# Patient Record
Sex: MALE | Race: WHITE | NOT HISPANIC OR LATINO | Employment: UNEMPLOYED
[De-identification: names, ages, dates, MRNs, and addresses within clinical notes are randomized per-mention and may not be internally consistent; named-entity substitution may affect disease eponyms.]

---

## 2018-12-12 ENCOUNTER — RECORDS - HEALTHEAST (OUTPATIENT)
Dept: ADMINISTRATIVE | Facility: OTHER | Age: 58
End: 2018-12-12

## 2018-12-12 LAB
LAB AP CHARGES (HE HISTORICAL CONVERSION): NORMAL
PATH REPORT.COMMENTS IMP SPEC: NORMAL
PATH REPORT.COMMENTS IMP SPEC: NORMAL
PATH REPORT.FINAL DX SPEC: NORMAL
PATH REPORT.GROSS SPEC: NORMAL
PATH REPORT.MICROSCOPIC SPEC OTHER STN: NORMAL
PATH REPORT.RELEVANT HX SPEC: NORMAL
RESULT FLAG (HE HISTORICAL CONVERSION): NORMAL

## 2021-08-17 ENCOUNTER — HOSPITAL ENCOUNTER (EMERGENCY)
Facility: HOSPITAL | Age: 61
Discharge: HOME | End: 2021-08-17
Admitting: PHYSICIAN ASSISTANT
Payer: COMMERCIAL

## 2021-08-17 ENCOUNTER — APPOINTMENT (OUTPATIENT)
Dept: CT IMAGING | Facility: HOSPITAL | Age: 61
End: 2021-08-17
Payer: COMMERCIAL

## 2021-08-17 VITALS
RESPIRATION RATE: 16 BRPM | DIASTOLIC BLOOD PRESSURE: 71 MMHG | HEIGHT: 66 IN | OXYGEN SATURATION: 91 % | SYSTOLIC BLOOD PRESSURE: 127 MMHG | HEART RATE: 77 BPM | TEMPERATURE: 97 F | BODY MASS INDEX: 32.95 KG/M2 | WEIGHT: 205 LBS

## 2021-08-17 DIAGNOSIS — N20.0 NEPHROLITHIASIS: ICD-10-CM

## 2021-08-17 DIAGNOSIS — R10.9 ACUTE RIGHT FLANK PAIN: ICD-10-CM

## 2021-08-17 LAB
ALBUMIN UR-MCNC: 20 MG/DL
ANION GAP SERPL CALCULATED.3IONS-SCNC: 11 MMOL/L (ref 5–18)
APPEARANCE UR: CLEAR
BACTERIA #/AREA URNS HPF: ABNORMAL /HPF
BILIRUB UR QL STRIP: NEGATIVE
BUN SERPL-MCNC: 14 MG/DL (ref 8–22)
C REACTIVE PROTEIN LHE: 0.3 MG/DL (ref 0–0.8)
CALCIUM SERPL-MCNC: 9.1 MG/DL (ref 8.5–10.5)
CHLORIDE BLD-SCNC: 104 MMOL/L (ref 98–107)
CO2 SERPL-SCNC: 26 MMOL/L (ref 22–31)
COLOR UR AUTO: YELLOW
CREAT SERPL-MCNC: 1.1 MG/DL (ref 0.7–1.3)
ERYTHROCYTE [DISTWIDTH] IN BLOOD BY AUTOMATED COUNT: 13.3 % (ref 10–15)
GFR SERPL CREATININE-BSD FRML MDRD: 73 ML/MIN/1.73M2
GLUCOSE BLD-MCNC: 157 MG/DL (ref 70–125)
GLUCOSE UR STRIP-MCNC: NEGATIVE MG/DL
HCT VFR BLD AUTO: 44.6 % (ref 40–53)
HGB BLD-MCNC: 15.1 G/DL (ref 13.3–17.7)
HGB UR QL STRIP: NEGATIVE
KETONES UR STRIP-MCNC: NEGATIVE MG/DL
LEUKOCYTE ESTERASE UR QL STRIP: ABNORMAL
MCH RBC QN AUTO: 31.3 PG (ref 26.5–33)
MCHC RBC AUTO-ENTMCNC: 33.9 G/DL (ref 31.5–36.5)
MCV RBC AUTO: 93 FL (ref 78–100)
MUCOUS THREADS #/AREA URNS LPF: PRESENT /LPF
NITRATE UR QL: NEGATIVE
PH UR STRIP: 6.5 [PH] (ref 5–7)
PLATELET # BLD AUTO: 194 10E3/UL (ref 150–450)
POTASSIUM BLD-SCNC: 3.6 MMOL/L (ref 3.5–5)
RBC # BLD AUTO: 4.82 10E6/UL (ref 4.4–5.9)
RBC URINE: 8 /HPF
SODIUM SERPL-SCNC: 141 MMOL/L (ref 136–145)
SP GR UR STRIP: 1.03 (ref 1–1.03)
SQUAMOUS EPITHELIAL: <1 /HPF
UROBILINOGEN UR STRIP-MCNC: <2 MG/DL
WBC # BLD AUTO: 9.8 10E3/UL (ref 4–11)
WBC URINE: 14 /HPF

## 2021-08-17 PROCEDURE — 99285 EMERGENCY DEPT VISIT HI MDM: CPT | Mod: 25

## 2021-08-17 PROCEDURE — 74176 CT ABD & PELVIS W/O CONTRAST: CPT

## 2021-08-17 PROCEDURE — 250N000013 HC RX MED GY IP 250 OP 250 PS 637: Performed by: EMERGENCY MEDICINE

## 2021-08-17 PROCEDURE — 96375 TX/PRO/DX INJ NEW DRUG ADDON: CPT

## 2021-08-17 PROCEDURE — 80048 BASIC METABOLIC PNL TOTAL CA: CPT | Performed by: PHYSICIAN ASSISTANT

## 2021-08-17 PROCEDURE — 87086 URINE CULTURE/COLONY COUNT: CPT | Performed by: EMERGENCY MEDICINE

## 2021-08-17 PROCEDURE — 81001 URINALYSIS AUTO W/SCOPE: CPT | Performed by: EMERGENCY MEDICINE

## 2021-08-17 PROCEDURE — 86141 C-REACTIVE PROTEIN HS: CPT | Performed by: PHYSICIAN ASSISTANT

## 2021-08-17 PROCEDURE — 96372 THER/PROPH/DIAG INJ SC/IM: CPT | Performed by: PHYSICIAN ASSISTANT

## 2021-08-17 PROCEDURE — 250N000011 HC RX IP 250 OP 636: Performed by: PHYSICIAN ASSISTANT

## 2021-08-17 PROCEDURE — 96361 HYDRATE IV INFUSION ADD-ON: CPT

## 2021-08-17 PROCEDURE — 36415 COLL VENOUS BLD VENIPUNCTURE: CPT | Performed by: PHYSICIAN ASSISTANT

## 2021-08-17 PROCEDURE — 258N000003 HC RX IP 258 OP 636: Performed by: PHYSICIAN ASSISTANT

## 2021-08-17 PROCEDURE — 85027 COMPLETE CBC AUTOMATED: CPT | Performed by: PHYSICIAN ASSISTANT

## 2021-08-17 PROCEDURE — 250N000011 HC RX IP 250 OP 636: Performed by: EMERGENCY MEDICINE

## 2021-08-17 PROCEDURE — 96374 THER/PROPH/DIAG INJ IV PUSH: CPT

## 2021-08-17 RX ORDER — ONDANSETRON 4 MG/1
4 TABLET, ORALLY DISINTEGRATING ORAL ONCE
Status: COMPLETED | OUTPATIENT
Start: 2021-08-17 | End: 2021-08-17

## 2021-08-17 RX ORDER — DIMENHYDRINATE 50 MG
50 TABLET ORAL AT BEDTIME
Qty: 7 TABLET | Refills: 0 | Status: SHIPPED | OUTPATIENT
Start: 2021-08-17 | End: 2021-08-24

## 2021-08-17 RX ORDER — OXYCODONE HYDROCHLORIDE 5 MG/1
5 TABLET ORAL EVERY 4 HOURS PRN
Qty: 12 TABLET | Refills: 0 | Status: SHIPPED | OUTPATIENT
Start: 2021-08-17 | End: 2021-08-21

## 2021-08-17 RX ORDER — ACETAMINOPHEN 325 MG/1
975 TABLET ORAL ONCE
Status: COMPLETED | OUTPATIENT
Start: 2021-08-17 | End: 2021-08-17

## 2021-08-17 RX ORDER — MORPHINE SULFATE 4 MG/ML
4 INJECTION, SOLUTION INTRAMUSCULAR; INTRAVENOUS ONCE
Status: COMPLETED | OUTPATIENT
Start: 2021-08-17 | End: 2021-08-17

## 2021-08-17 RX ORDER — ONDANSETRON 2 MG/ML
4 INJECTION INTRAMUSCULAR; INTRAVENOUS ONCE
Status: COMPLETED | OUTPATIENT
Start: 2021-08-17 | End: 2021-08-17

## 2021-08-17 RX ORDER — CEPHALEXIN 500 MG/1
500 CAPSULE ORAL 4 TIMES DAILY
Qty: 28 CAPSULE | Refills: 0 | Status: SHIPPED | OUTPATIENT
Start: 2021-08-17 | End: 2021-08-24

## 2021-08-17 RX ORDER — ONDANSETRON 4 MG/1
4 TABLET, ORALLY DISINTEGRATING ORAL EVERY 8 HOURS PRN
Qty: 10 TABLET | Refills: 0 | Status: SHIPPED | OUTPATIENT
Start: 2021-08-17 | End: 2021-08-20

## 2021-08-17 RX ORDER — DIMENHYDRINATE 50 MG
50 TABLET ORAL EVERY 6 HOURS PRN
Qty: 28 TABLET | Refills: 0 | Status: SHIPPED | OUTPATIENT
Start: 2021-08-17 | End: 2021-08-24

## 2021-08-17 RX ORDER — KETOROLAC TROMETHAMINE 30 MG/ML
30 INJECTION, SOLUTION INTRAMUSCULAR; INTRAVENOUS ONCE
Status: COMPLETED | OUTPATIENT
Start: 2021-08-17 | End: 2021-08-17

## 2021-08-17 RX ADMIN — SODIUM CHLORIDE 500 ML: 9 INJECTION, SOLUTION INTRAVENOUS at 10:55

## 2021-08-17 RX ADMIN — SODIUM CHLORIDE 1000 ML: 9 INJECTION, SOLUTION INTRAVENOUS at 08:37

## 2021-08-17 RX ADMIN — MORPHINE SULFATE 4 MG: 4 INJECTION, SOLUTION INTRAMUSCULAR; INTRAVENOUS at 08:41

## 2021-08-17 RX ADMIN — ONDANSETRON 4 MG: 4 TABLET, ORALLY DISINTEGRATING ORAL at 06:12

## 2021-08-17 RX ADMIN — KETOROLAC TROMETHAMINE 30 MG: 30 INJECTION, SOLUTION INTRAMUSCULAR; INTRAVENOUS at 07:35

## 2021-08-17 RX ADMIN — ACETAMINOPHEN 975 MG: 325 TABLET ORAL at 06:14

## 2021-08-17 RX ADMIN — ONDANSETRON 4 MG: 2 INJECTION INTRAMUSCULAR; INTRAVENOUS at 08:38

## 2021-08-17 RX ADMIN — Medication 50 MG: at 06:13

## 2021-08-17 RX ADMIN — HYDROMORPHONE HYDROCHLORIDE 0.5 MG: 1 INJECTION, SOLUTION INTRAMUSCULAR; INTRAVENOUS; SUBCUTANEOUS at 10:02

## 2021-08-17 ASSESSMENT — ENCOUNTER SYMPTOMS
HEMATURIA: 0
NAUSEA: 1
VOMITING: 1
DYSURIA: 0
FEVER: 0
CHILLS: 0
FLANK PAIN: 1
DIARRHEA: 0

## 2021-08-17 ASSESSMENT — MIFFLIN-ST. JEOR: SCORE: 1687.62

## 2021-08-17 NOTE — DISCHARGE INSTRUCTIONS
As we discussed, you have a 2 mm stone at the end of your right ureter to the source of your pain.  We will send you home with a urine strainer, and advised that you alternate between Tylenol and Motrin for pain relief.  I will send you home with a small prescription for oxycodone for breakthrough pain.  This is a strong pain medication and can make you drowsy, please do not take this or working, driving, or operating heavy machinery.  We will also send you home with Dramamine and Zofran for the nausea.  I have placed a referral for follow-up with her kidney stone [place].  Please call them after leaving today to schedule close follow-up.  Your urine culture is pending at this time and we will place you on antibiotics until this results.  Please take these as prescribed.  If at anytime you develop fever, uncontrollable pain or vomiting, or any new or concerning symptoms please return to the ER for further evaluation.

## 2021-08-17 NOTE — ED NOTES
Pt given instructions on how to strain urine.  Pt sent with urinal, graduate, strainer and sterile urine cup.  Pt states he understands.

## 2021-08-17 NOTE — ED NOTES
Pain improves after pain med but returns.  Pt states soreness remains in right kidney.  Pt states nausea come with increased pain.

## 2021-08-17 NOTE — ED PROVIDER NOTES
Emergency Department Encounter   NAME: Yohannes Nuñez ; AGE: 60 year old male ; YOB: 1960 ; MRN: [id number] ; PCP: No primary care provider on file.   ED PROVIDER: Micki Harry PA-C    Evaluation Date & Time:   No admission date for patient encounter.    CHIEF COMPLAINT:  Flank Pain      Impression and Plan   MDM: Yohannes Nuñez is a 60 year old male with a pertinent history of acute renal failure, hypertension, and anemia who presents to the ED by walk-in for evaluation of right flank pain and emesis.  The patient presents to the emergency department for evaluation of right flank pain that woke him up from sleep at 4 AM, initially some radiation into the right testicle, however this has resolved and pain is settled in his right flank along with persistent nausea and vomiting.  Here in the ED, he is afebrile and vitally stable, though is in acute distress due to pain and actively dry heaving in the room.  Clinically, he does have dry mucous membranes.  IV established and IV fluids ordered.  He does have reproducible tenderness over his right CVA and has a history of nephrolithiasis with similar symptoms today.  High suspicion for obstructing stone.  CT of abdomen and pelvis ordered along with urine and blood work.  Remainder of his abdomen is completely benign without any reproducible tenderness or evidence of peritonitis.  Performed testicular exam and there is no tenderness, skin changes, high riding testicle or absent cremasteric reflex to suggest torsion.  No evidence of epididymitis or orchitis.  Patient received Toradol in triage with some relief.  Zofran and morphine ordered for further symptomatic cares.    CT confirmed the likely diagnosis of a 1 to 2 mm calculus at the right UVJ resulting in mild right-sided hydronephrosis, renal edema, and minimal perinephric stranding.  There are 3 other right-sided calculi within the kidney.  Large hiatal hernia also noted, however without any  evidence of obstruction or strangulation.  Vasculature appears normal and he has no flank ecchymosis or abdominal distention concerning for AAA.  CT without any evidence of other emergent pathology such as visceral perforation, intra-abdominal abscess, intussusception, volvulus, bowel ischemia, etc.  Renal function within normal limits.  Symptoms started earlier this morning and he denies any recent fevers or infectious symptoms.  Is afebrile and vitally stable here in the ED.  No leukocytosis.  Unfortunately, CRP is not resulted at time of discharge due to multiple malfunctions/maintenance on the machine.  Patient is not showing any signs of urosepsis or SIRS.  Urine obtained and shows a small amount of leukocytes, small amount of bacteria, and 14 WBCs.  Overall, not very impressive for infection though given these mild inflammatory markers, discussed the case with urology.  At this time, they do not feel that results represent infection or that patient needs emergent stent or admission.  Urine culture sent and pending.  Advise covering with Keflex until this results.  Referral to kidney stone [place] [facility] with plan for patient to strain urine at home and undergo supportive measures for his symptoms.  He had significant relief of his nausea and pain here in the ED.  He has been tolerating orals and feels that he can manage as an outpatient.  We discussed alternating between Tylenol and ibuprofen, heating packs, Zofran and Dramamine, and will provide several tablets of oxycodone for pain relief.  We discussed medication side effects as well as concerning signs and symptoms to return to the ED.  He verbalized understanding is comfortable with the plan.  Discharged home in good condition.        ED COURSE:  8:25 AM I met and introduced myself to the patient. I gathered initial history and performed my physical exam. We discussed plan for initial workup.   9:26 AM I rechecked an updated the patient. Patient's pain  has improved and has not had emesis since nausea medication.   9:57 AM Lab updated that they are not doing patient's CRP yet as they are doing maintenance on the machine.   11:32 AM Paged [facility].   12:12 PM I consulted Donita MEYERS with [facility].   12:37 PM I discussed the plan for discharge with the patient, and patient is agreeable. We discussed supportive cares at home and reasons for return to the ER including new or worsening symptoms - all questions and concerns addressed. Patient to be discharged by RN.     At the conclusion of the encounter I discussed the results of all the tests and the disposition. The questions were answered. The patient or family acknowledged understanding and was agreeable with the care plan.    PPE: Provider wore gloves, N95 mask, eye protection, surgical cap, and paper mask.       FINAL IMPRESSION:    ICD-10-CM    1. Acute right flank pain  R10.9 Adult Urology Referral   2. Nephrolithiasis  N20.0        MEDICATIONS GIVEN IN THE EMERGENCY DEPARTMENT:  Medications   ondansetron (ZOFRAN-ODT) ODT tab 4 mg (4 mg Oral Given 8/17/21 0612)   dimenhyDRINATE chew tab 50 mg (50 mg Oral Given 8/17/21 0613)   acetaminophen (TYLENOL) tablet 975 mg (975 mg Oral Given 8/17/21 0614)   ketorolac (TORADOL) injection 30 mg (30 mg Intramuscular Given 8/17/21 0735)   ondansetron (ZOFRAN) injection 4 mg (4 mg Intravenous Given 8/17/21 0838)   0.9% sodium chloride BOLUS (0 mLs Intravenous Stopped 8/17/21 1048)   morphine (PF) injection 4 mg (4 mg Intravenous Given 8/17/21 0841)   HYDROmorphone (DILAUDID) injection 0.5 mg (0.5 mg Intravenous Given 8/17/21 1002)   0.9% sodium chloride BOLUS (0 mLs Intravenous Stopped 8/17/21 1206)       NEW PRESCRIPTIONS STARTED AT TODAY'S ED VISIT:  Discharge Medication List as of 8/17/2021 12:40 PM      START taking these medications    Details   !! dimenhyDRINATE (DRAMAMINE) 50 MG tablet Take 1 tablet (50 mg) by mouth At Bedtime for 7 days, Disp-7 tablet, R-0, Local Print      !!  dimenhyDRINATE (DRAMAMINE) 50 MG tablet Take 1 tablet (50 mg) by mouth every 6 hours as needed for other (kidney stone pain management), Disp-28 tablet, R-0, Local Print      ondansetron (ZOFRAN ODT) 4 MG ODT tab Take 1 tablet (4 mg) by mouth every 8 hours as needed for nausea, Disp-10 tablet, R-0, Local Print      oxyCODONE (ROXICODONE) 5 MG tablet Take 1 tablet (5 mg) by mouth every 4 hours as needed for severe pain If pain is not improved with acetaminophen and ibuprofen., Disp-12 tablet, R-0, Local Print       !! - Potential duplicate medications found. Please discuss with provider.            HPI   Patient information was obtained from: Patient  Use of Intrepreter: N/A     Yohannes Nuñez is a 60 year old male with a pertinent history of acute renal failure, hypertension, and anemia who presents to the ED by walk-in for evaluation of right flank pain and emesis.     Patient states that he woke up this morning around 0400 with some testicular pain that moved into his right flank after going to bed fine. Pain is rated at a 9/10 and is currently localized to right flank. Patient also endorses nausea and emesis. He has a history of kidney stones and state that his current symptoms are similar. Patient denies fever, chills, body aches, diarrhea, dysuria, hematuria, and any other symptoms or complaints at this time. He denies history of stone retrieval.     Patient is currently taking Lisinopril.     REVIEW OF SYSTEMS:  Review of Systems   Constitutional: Negative for chills and fever.   Gastrointestinal: Positive for nausea and vomiting. Negative for diarrhea.   Genitourinary: Positive for flank pain (right sided) and testicular pain (resolved). Negative for dysuria and hematuria.   All other systems reviewed and are negative.      Medical History     No past medical history on file.    No past surgical history on file.    No family history on file.    Social History     Tobacco Use     Smoking status: Never Smoker  "  Substance Use Topics     Alcohol use: Yes     Drug use: Not on file       cephALEXin (KEFLEX) 500 MG capsule  dimenhyDRINATE (DRAMAMINE) 50 MG tablet  dimenhyDRINATE (DRAMAMINE) 50 MG tablet  ondansetron (ZOFRAN ODT) 4 MG ODT tab  oxyCODONE (ROXICODONE) 5 MG tablet        Physical Exam     First Vitals:  Patient Vitals for the past 24 hrs:   BP Temp Pulse Resp SpO2 Height Weight   08/17/21 1245 127/71 -- 77 -- 91 % -- --   08/17/21 1230 (!) 141/78 -- 82 -- 95 % -- --   08/17/21 1215 123/73 -- 73 -- 94 % -- --   08/17/21 1200 123/74 -- 72 -- 95 % -- --   08/17/21 1145 126/73 -- 77 -- 93 % -- --   08/17/21 1130 132/77 -- 76 -- 94 % -- --   08/17/21 1115 136/81 -- 83 -- 95 % -- --   08/17/21 1100 129/79 -- 76 -- 95 % -- --   08/17/21 1045 (!) 159/88 -- 85 -- 93 % -- --   08/17/21 1030 134/84 -- 78 -- 95 % -- --   08/17/21 1015 131/79 -- 72 -- 93 % -- --   08/17/21 1000 (!) 144/94 -- 76 -- 94 % -- --   08/17/21 0945 137/86 -- 65 -- 94 % -- --   08/17/21 0930 (!) 139/90 -- 70 -- 94 % -- --   08/17/21 0927 -- -- 76 -- 94 % -- --   08/17/21 0845 (!) 149/90 -- 74 -- 94 % -- --   08/17/21 0602 132/79 97  F (36.1  C) 90 16 100 % 1.676 m (5' 6\") 93 kg (205 lb)       PHYSICAL EXAM:   Physical Exam  Vitals and nursing note reviewed.   Constitutional:       General: He is in acute distress (due to pain).      Appearance: Normal appearance.      Comments: Actively dry heaving.    HENT:      Mouth/Throat:      Mouth: Mucous membranes are dry.   Eyes:      Conjunctiva/sclera: Conjunctivae normal.      Pupils: Pupils are equal, round, and reactive to light.   Cardiovascular:      Rate and Rhythm: Normal rate and regular rhythm.      Pulses: Normal pulses.      Heart sounds: Normal heart sounds. No murmur heard.   No friction rub. No gallop.    Pulmonary:      Effort: Pulmonary effort is normal. No respiratory distress.      Breath sounds: Normal breath sounds.   Abdominal:      General: Abdomen is flat. Bowel sounds are normal. " There is no distension.      Palpations: Abdomen is soft. There is no mass.      Tenderness: There is no abdominal tenderness. There is right CVA tenderness. There is no left CVA tenderness, guarding or rebound.      Comments: Obese abdomen. No flank ecchymosis. No skin changes or rashes.  No pulsatile midline mass.   Genitourinary:     Comments: Testicles palpated in scrotum. No skin changes, erythema, or high riding testicle. No tenderness. Cremasteric reflex present.  Skin:     General: Skin is warm and dry.      Capillary Refill: Capillary refill takes less than 2 seconds.   Neurological:      Mental Status: He is alert.         Results     LAB:  All pertinent labs reviewed and interpreted  Labs Ordered and Resulted from Time of ED Arrival Up to the Time of Departure from the ED   ROUTINE UA WITH MICROSCOPIC REFLEX TO CULTURE - Abnormal; Notable for the following components:       Result Value    Protein Albumin Urine 20  (*)     Leukocyte Esterase Urine 25 Nina/uL (*)     Bacteria Urine Few (*)     Mucus Urine Present (*)     RBC Urine 8 (*)     WBC Urine 14 (*)     All other components within normal limits    Narrative:     Urine Culture ordered based on laboratory criteria   BASIC METABOLIC PANEL - Abnormal; Notable for the following components:    Glucose 157 (*)     All other components within normal limits   CBC WITH PLATELETS - Normal   CRP INFLAMMATION - Normal   STRAIN URINE   PATIENT CARE ORDER   NOTIFY PROVIDER   CALL   URINE CULTURE       RADIOLOGY:  CT Abdomen Pelvis w/o Contrast   Final Result   IMPRESSION:    1.  There is mild right-sided hydronephrosis, renal edema and minimal perinephric stranding due to a 1 to 2 mm calculus at the right ureterovesical junction. In addition, there are 3 other right-sided calculi, the largest measuring 6 x 4 x 5 mm in the    right renal pelvis and the other 2 being punctate intrarenal calculi.   2.  No stone burden on the left.   3.  Large hiatal hernia with the  proximal third of the stomach in the chest.   4.  Mild distal colonic diverticulosis.             I, Dolores Guerrero, am serving as a scribe to document services personally performed by Micki Harry PA-C, based on my observation and the provider's statements to me. I, Micki Harry PA-C attest that Dolores Guerrero is acting in a scribe capacity, has observed my performance of the services and has documented them in accordance with my direction.       Micki Harry PA-C   Emergency Medicine   [facility] EMERGENCY DEPARTMENT       Micki Harry PA-C  08/17/21 7055

## 2021-08-17 NOTE — ED NOTES
"Pt states woke at 0400 with discomfort in his \"testicles.\"  Pain then went to his right back.  Pt has had kidnney stones about 15 years ago.  Pt felt well last night before going to bed.  "

## 2021-08-18 ENCOUNTER — TELEPHONE (OUTPATIENT)
Dept: UROLOGY | Facility: CLINIC | Age: 61
End: 2021-08-18

## 2021-08-18 LAB — BACTERIA UR CULT: NO GROWTH

## 2021-08-18 NOTE — TELEPHONE ENCOUNTER
Message left for patient to call clinic to set up an appointment for kidney stone follow-up.  Dania Sanon RN

## 2021-08-19 ENCOUNTER — TELEPHONE (OUTPATIENT)
Dept: UROLOGY | Facility: CLINIC | Age: 61
End: 2021-08-19

## 2021-08-20 ENCOUNTER — TELEPHONE (OUTPATIENT)
Dept: UROLOGY | Facility: CLINIC | Age: 61
End: 2021-08-20